# Patient Record
Sex: FEMALE | Race: WHITE
[De-identification: names, ages, dates, MRNs, and addresses within clinical notes are randomized per-mention and may not be internally consistent; named-entity substitution may affect disease eponyms.]

---

## 2021-11-17 ENCOUNTER — HOSPITAL ENCOUNTER (EMERGENCY)
Dept: HOSPITAL 54 - ER | Age: 26
Discharge: HOME | End: 2021-11-17
Payer: COMMERCIAL

## 2021-11-17 VITALS — HEIGHT: 70 IN | BODY MASS INDEX: 19.33 KG/M2 | WEIGHT: 135 LBS

## 2021-11-17 VITALS — DIASTOLIC BLOOD PRESSURE: 73 MMHG | SYSTOLIC BLOOD PRESSURE: 125 MMHG

## 2021-11-17 DIAGNOSIS — M25.511: Primary | ICD-10-CM

## 2021-11-17 DIAGNOSIS — Z88.2: ICD-10-CM

## 2021-11-17 DIAGNOSIS — Z88.1: ICD-10-CM

## 2021-11-17 NOTE — NUR
Patient discharged to home in stable condition. Written and verbal after care 
instructions given. Patient verbalizes understanding of instruction. Pt 
ambulatory with a steady gait.

## 2021-11-17 NOTE — NUR
PT BIBSELF C/O RT SHOULDER PAIN. PT AAOX4 BREATHING EVENLY AND UNLABORED. PT 
STATES THAT SHE FELT THE PAIN WHEN SHE WAS TYPING ON THE COMPUTER, DENIES ANY 
TRAUMA. PT ATTACHED TO MONITOR AND POX. MD AT BEDSIDE. PT GIVEN BLANKET AND 
CALL LIGHT WITHIN REACH